# Patient Record
Sex: MALE | URBAN - METROPOLITAN AREA
[De-identification: names, ages, dates, MRNs, and addresses within clinical notes are randomized per-mention and may not be internally consistent; named-entity substitution may affect disease eponyms.]

---

## 2017-06-16 ENCOUNTER — APPOINTMENT (RX ONLY)
Dept: URBAN - METROPOLITAN AREA CLINIC 81 | Facility: CLINIC | Age: 57
Setting detail: DERMATOLOGY
End: 2017-06-16

## 2017-06-16 DIAGNOSIS — D485 NEOPLASM OF UNCERTAIN BEHAVIOR OF SKIN: ICD-10-CM

## 2017-06-16 DIAGNOSIS — B35.1 TINEA UNGUIUM: ICD-10-CM

## 2017-06-16 DIAGNOSIS — B35.3 TINEA PEDIS: ICD-10-CM

## 2017-06-16 PROBLEM — L29.8 OTHER PRURITUS: Status: ACTIVE | Noted: 2017-06-16

## 2017-06-16 PROBLEM — D48.5 NEOPLASM OF UNCERTAIN BEHAVIOR OF SKIN: Status: ACTIVE | Noted: 2017-06-16

## 2017-06-16 PROCEDURE — ? BIOPSY BY SHAVE METHOD

## 2017-06-16 PROCEDURE — ? COUNSELING

## 2017-06-16 PROCEDURE — ? TREATMENT REGIMEN

## 2017-06-16 PROCEDURE — 99213 OFFICE O/P EST LOW 20 MIN: CPT | Mod: 25

## 2017-06-16 PROCEDURE — 11100: CPT

## 2017-06-16 PROCEDURE — ? PRESCRIPTION

## 2017-06-16 PROCEDURE — ? ORDER TESTS

## 2017-06-16 RX ORDER — TERBINAFINE HYDROCHLORIDE 250 MG/1
TABLET ORAL QD
Qty: 30 | Refills: 0 | Status: CANCELLED

## 2017-06-16 ASSESSMENT — LOCATION DETAILED DESCRIPTION DERM
LOCATION DETAILED: LEFT MEDIAL PLANTAR MIDFOOT
LOCATION DETAILED: RIGHT MEDIAL DORSAL WRIST
LOCATION DETAILED: 1ST WEBSPACE RIGHT FOOT
LOCATION DETAILED: RIGHT MEDIAL PLANTAR MIDFOOT
LOCATION DETAILED: 1ST WEBSPACE LEFT FOOT

## 2017-06-16 ASSESSMENT — LOCATION SIMPLE DESCRIPTION DERM
LOCATION SIMPLE: LEFT FOOT
LOCATION SIMPLE: RIGHT WRIST
LOCATION SIMPLE: RIGHT PLANTAR SURFACE
LOCATION SIMPLE: RIGHT FOOT
LOCATION SIMPLE: LEFT PLANTAR SURFACE

## 2017-06-16 ASSESSMENT — LOCATION ZONE DERM
LOCATION ZONE: ARM
LOCATION ZONE: FEET

## 2017-06-16 NOTE — PROCEDURE: BIOPSY BY SHAVE METHOD
Wound Care: Aquaphor
Cryotherapy Text: The wound bed was treated with cryotherapy after the biopsy was performed.
Hemostasis: Aluminum Chloride and Electrocautery
Additional Anesthesia Volume In Cc (Will Not Render If 0): 0
Post-Care Instructions: I reviewed with the patient in detail post-care instructions. Patient is to keep the biopsy site dry overnight, and then apply bacitracin twice daily until healed. Patient may apply hydrogen peroxide soaks to remove any crusting.
Notification Instructions: Patient will be notified of biopsy results. However, patient instructed to call the office if not contacted within 2 weeks.
Dressing: bandage
Lab: [id number]
Biopsy Method: Personna blade
Type Of Destruction Used: Curettage
Electrodesiccation And Curettage Text: The wound bed was treated with electrodesiccation and curettage after the biopsy was performed.
Biopsy Type: H and E
Destruction After The Procedure: No
Size Of Lesion In Cm: 0.4
Consent: Written consent was obtained and risks were reviewed including but not limited to scarring, infection, bleeding, scabbing, incomplete removal, nerve damage and allergy to anesthesia.
Curettage Text: The wound bed was treated with curettage after the biopsy was performed.
Electrodesiccation Text: The wound bed was treated with electrodesiccation after the biopsy was performed.
Anesthesia Volume In Cc (Will Not Render If 0): 0.5
Lab Facility: [id number]
Silver Nitrate Text: The wound bed was treated with silver nitrate after the biopsy was performed.
Detail Level: Detailed
Billing Type: Third-Party Bill
Anesthesia Type: 1% lidocaine with epinephrine

## 2017-06-16 NOTE — PROCEDURE: TREATMENT REGIMEN
Detail Level: Zone
Plan: once the lab work results have been received we will send in the RX.\\nF/u 1 month

## 2017-06-16 NOTE — HPI: BODY LOCATION - FEET
How Severe Is Your Condition?: moderate
Additional History: The patient states that he has \"tried every topical and nothing  has helped and is now ready to treat with Lamisil\".
Year Removed: 1900

## 2017-07-12 ENCOUNTER — RX ONLY (OUTPATIENT)
Age: 57
Setting detail: RX ONLY
End: 2017-07-12

## 2017-07-12 RX ORDER — TERBINAFINE HYDROCHLORIDE 250 MG/1
TABLET ORAL
Qty: 30 | Refills: 0 | Status: ERX | COMMUNITY
Start: 2017-07-12

## 2017-09-03 ENCOUNTER — RX ONLY (OUTPATIENT)
Age: 57
Setting detail: RX ONLY
End: 2017-09-03

## 2017-09-03 RX ORDER — TERBINAFINE HYDROCHLORIDE 250 MG/1
TABLET ORAL
Qty: 30 | Refills: 0 | Status: ERX

## 2017-10-25 ENCOUNTER — RX ONLY (OUTPATIENT)
Age: 57
Setting detail: RX ONLY
End: 2017-10-25

## 2017-10-25 RX ORDER — TERBINAFINE HYDROCHLORIDE 250 MG/1
TABLET ORAL
Qty: 30 | Refills: 0 | Status: ERX

## 2018-01-31 ENCOUNTER — APPOINTMENT (RX ONLY)
Dept: URBAN - METROPOLITAN AREA CLINIC 81 | Facility: CLINIC | Age: 58
Setting detail: DERMATOLOGY
End: 2018-01-31

## 2018-01-31 DIAGNOSIS — B35.1 TINEA UNGUIUM: ICD-10-CM | Status: IMPROVED

## 2018-01-31 DIAGNOSIS — B35.3 TINEA PEDIS: ICD-10-CM

## 2018-01-31 DIAGNOSIS — L57.0 ACTINIC KERATOSIS: ICD-10-CM

## 2018-01-31 PROCEDURE — 99213 OFFICE O/P EST LOW 20 MIN: CPT | Mod: 25

## 2018-01-31 PROCEDURE — ? TREATMENT REGIMEN

## 2018-01-31 PROCEDURE — ? COUNSELING

## 2018-01-31 PROCEDURE — ? LIQUID NITROGEN

## 2018-01-31 ASSESSMENT — LOCATION DETAILED DESCRIPTION DERM
LOCATION DETAILED: RIGHT CENTRAL EYEBROW
LOCATION DETAILED: LEFT DISTAL PLANTAR GREAT TOE
LOCATION DETAILED: 1ST WEBSPACE RIGHT FOOT
LOCATION DETAILED: RIGHT LATERAL EYEBROW
LOCATION DETAILED: 1ST WEBSPACE LEFT FOOT
LOCATION DETAILED: RIGHT GREAT TOENAIL

## 2018-01-31 ASSESSMENT — LOCATION ZONE DERM
LOCATION ZONE: TOE
LOCATION ZONE: FACE
LOCATION ZONE: FACE
LOCATION ZONE: TOENAIL
LOCATION ZONE: FEET

## 2018-01-31 ASSESSMENT — LOCATION SIMPLE DESCRIPTION DERM
LOCATION SIMPLE: LEFT GREAT TOE
LOCATION SIMPLE: RIGHT EYEBROW
LOCATION SIMPLE: LEFT FOOT
LOCATION SIMPLE: RIGHT GREAT TOE
LOCATION SIMPLE: RIGHT EYEBROW
LOCATION SIMPLE: RIGHT FOOT

## 2018-01-31 ASSESSMENT — PAIN INTENSITY VAS: HOW INTENSE IS YOUR PAIN 0 BEING NO PAIN, 10 BEING THE MOST SEVERE PAIN POSSIBLE?: NO PAIN

## 2018-01-31 NOTE — PROCEDURE: TREATMENT REGIMEN
Detail Level: Zone
Plan: no further treatment needed.\\nf/u prn
Otc Regimen: Lamisil spray in shoes Q 6  months
Samples Given: Naftin Gel- apply twice daily for two weeks between toe web spaces

## 2020-06-30 ENCOUNTER — RX ONLY (OUTPATIENT)
Age: 60
Setting detail: RX ONLY
End: 2020-06-30

## 2020-06-30 RX ORDER — LORAZEPAM 1 MG/1
TABLET ORAL
Qty: 12 | Refills: 0 | Status: CANCELLED